# Patient Record
Sex: MALE | Race: BLACK OR AFRICAN AMERICAN | NOT HISPANIC OR LATINO | Employment: FULL TIME | URBAN - METROPOLITAN AREA
[De-identification: names, ages, dates, MRNs, and addresses within clinical notes are randomized per-mention and may not be internally consistent; named-entity substitution may affect disease eponyms.]

---

## 2017-02-15 ENCOUNTER — ALLSCRIPTS OFFICE VISIT (OUTPATIENT)
Dept: OTHER | Facility: OTHER | Age: 37
End: 2017-02-15

## 2017-03-15 ENCOUNTER — HOSPITAL ENCOUNTER (EMERGENCY)
Facility: HOSPITAL | Age: 37
Discharge: HOME | End: 2017-03-16
Attending: EMERGENCY MEDICINE | Admitting: EMERGENCY MEDICINE
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
## 2017-03-15 RX ORDER — IBUPROFEN 600 MG/1
600 TABLET ORAL ONCE
Status: COMPLETED | OUTPATIENT
Start: 2017-03-16 | End: 2017-03-16

## 2017-03-16 ENCOUNTER — APPOINTMENT (EMERGENCY)
Dept: RADIOLOGY | Facility: HOSPITAL | Age: 37
End: 2017-03-16

## 2017-03-16 VITALS
BODY MASS INDEX: 43.4 KG/M2 | HEART RATE: 117 BPM | SYSTOLIC BLOOD PRESSURE: 130 MMHG | RESPIRATION RATE: 20 BRPM | TEMPERATURE: 98.6 F | OXYGEN SATURATION: 99 % | WEIGHT: 310 LBS | HEIGHT: 71 IN | DIASTOLIC BLOOD PRESSURE: 61 MMHG

## 2017-03-16 PROCEDURE — 70486 CT MAXILLOFACIAL W/O DYE: CPT

## 2017-03-16 PROCEDURE — 70450 CT HEAD/BRAIN W/O DYE: CPT

## 2017-03-16 PROCEDURE — 99284 EMERGENCY DEPT VISIT MOD MDM: CPT

## 2017-03-16 RX ADMIN — IBUPROFEN 600 MG: 600 TABLET, FILM COATED ORAL at 01:12

## 2017-08-15 DIAGNOSIS — Z13.1 ENCOUNTER FOR SCREENING FOR DIABETES MELLITUS: ICD-10-CM

## 2018-01-10 NOTE — PROGRESS NOTES
Assessment    1  Encounter for preventive health examination (V70 0) (Z00 00)   2  Screening for cardiovascular condition (V81 2) (Z13 6)   3  Screening for diabetes mellitus (V77 1) (Z13 1)   4  Family history of Colon carcinoma : Father    Plan  Health Maintenance    · Call [phone] if: You have any warning signs of skin cancer ; Status:Complete;    Done: [id number]   · Always use a seat belt and shoulder strap when riding or driving a motor vehicle ;  Status:Complete;   Done: 00QUH5215   · Begin a limited exercise program ; Status:Complete;   Done: 57RCI4655   · Brush your teeth 3 times a day and floss at least once a day ; Status:Complete;   Done:  68OPN8929   · Eat a low fat and low cholesterol diet ; Status:Complete;   Done: 47AYW7443   · Limit your use of alcohol to 2 drinks or cans of beer a day ; Status:Complete;   Done:  30ULQ4757   · Some eating tips that can help you lose weight ; Status:Complete;   Done: 38CMV4527   · Stretch and warm up your muscles during the first 10 minutes , then cool down your  muscles for the last 10 minutes of exercise ; Status:Complete;   Done: 87TEJ9276   · There are many ways to reduce your risk of catching or spreading a sexually transmitted  Infection ; Status:Complete;   Done: 16JYA1490   · Use a sun block product with an SPF of 15 or more ; Status:Complete;   Done:  81YTC0099   · We encourage all of our patients to exercise regularly  30 minutes of exercise or physical  activity five or more days a week is recommended for children and adults ;  Status:Complete;   Done: 34PCO9974   · We recommend routine visits to a dentist ; Status:Complete;   Done: 81KTK3313  Screening for diabetes mellitus    · (1) CBC/PLT/DIFF; Status:Active; Requested for:95Ejy4838;    · (1) COMPREHENSIVE METABOLIC PANEL; Status:Active; Requested for:64Aop0364;    · (1) HEMOGLOBIN A1C; Status:Active; Requested for:87Ptu7002;    · (1) LIPID PANEL, FASTING; Status:Active;  Requested for:32Quj1813;    · (1) TSH; Status:Active; Requested for:25Hwe9208;    · (1) URINALYSIS (will reflex a microscopy if leukocytes, occult blood, protein or nitrites  are not within normal limits); Status:Active; Requested for:57Oqh2486;     I will obtain the records from his prior doctor  Recommended to use condoms when sexually active  Continue efforts with diet and exercise  I will see him in 6 months when he will be due for his next lab work  He is to call the office if there's any problems  I would recommend he get his first colonoscopy at age 35  Chief Complaint  WELL ADULT      History of Present Illness  HM, Adult Male: The patient is being seen for a Patient is a 69-year-old male presented to the office for a wellness check and to establish as a new patient  evaluation  General Health: He has regular dental visits  He denies vision problems  Vision care includes an eye examination more than a year ago  Lifestyle:  He has weight concerns  He exercises regularly  He does not use tobacco  He consumes alcohol  He reports once / month  He denies drug use  Reproductive health:  the patient is sexually active  Screening: cancer screening reviewed and current  metabolic screening reviewed and current  risk screening reviewed and current  Review of Systems    Constitutional: No fever or chills, feels well, no tiredness, no recent weight gain or weight loss    The patient presents with complaints of 30 - 39 pound recent weight loss  Previous Evaluation: By diet and exercise  He is made dramatic changes in getting rid of high calorie foods  Eyes: No complaints of eye pain, no red eyes, no discharge from eyes, no itchy eyes  ENT: no complaints of earache, no hearing loss, no nosebleeds, no nasal discharge, no sore throat, no hoarseness  Cardiovascular: No complaints of slow heart rate, no fast heart rate, no chest pain, no palpitations, no leg claudication, no lower extremity     Respiratory: No complaints of shortness of breath, no wheezing, no cough, no SOB on exertion, no orthopnea or PND  Gastrointestinal: No complaints of abdominal pain, no constipation, no nausea or vomiting, no diarrhea or bloody stools  Genitourinary: No complaints of dysuria, no incontinence, no hesitancy, no nocturia, no genital lesion, no testicular pain  Musculoskeletal: No complaints of arthralgia, no myalgias, no joint swelling or stiffness, no limb pain or swelling  Integumentary: No complaints of skin rash or skin lesions, no itching, no skin wound, no dry skin  Neurological: No compliants of headache, no confusion, no convulsions, no numbness or tingling, no dizziness or fainting, no limb weakness, no difficulty walking  Psychiatric: Is not suicidal, no sleep disturbances, no anxiety or depression, no change in personality, no emotional problems  Endocrine: No complaints of proptosis, no hot flashes, no muscle weakness, no erectile dysfunction, no deepening of the voice, no feelings of weakness  Hematologic/Lymphatic: No complaints of swollen glands, no swollen glands in the neck, does not bleed easily, no easy bruising  Surgical History    · History of Elective Circumcision   · History of Knee Arthroscopy With Medial Meniscectomy   · History of Patellar Arthroplasty    Family History  Mother    · No pertinent family history  Father    · Family history of Colon carcinoma    Social History    · Alcohol use (V49 89) (Z78 9)   · Denied: History of Drug use   · Non-smoker (V49 89) (Z78 9)    Current Meds   1  No Reported Medications Recorded    Allergies    1  No Known Drug Allergies    Vitals   Recorded: 14VDM6695 37:23PU   Systolic 194   Diastolic 78   Height 5 ft 11 in   Weight 322 lb 2 oz   BMI Calculated 44 93   BSA Calculated 2 58     Physical Exam    Constitutional   General appearance: Abnormal   overweight  Eyes   Conjunctiva and lids: No erythema, swelling or discharge      Pupils and irises: Equal, round, reactive to light  Ears, Nose, Mouth, and Throat   External inspection of ears and nose: Normal     Otoscopic examination: Tympanic membranes translucent with normal light reflex  Canals patent without erythema  Hearing: Normal     Nasal mucosa, septum, and turbinates: Normal without edema or erythema  Lips, teeth, and gums: Normal, good dentition  Oropharynx: Normal with no erythema, edema, exudate or lesions  Inspection of the oropharynx showed visible hard and soft palate and base of the uvula (Mallampati class 3)  Neck   Neck: Supple, symmetric, trachea midline, no masses  Thyroid: Normal, no thyromegaly  Pulmonary   Respiratory effort: No increased work of breathing or signs of respiratory distress  Percussion of chest: Normal     Palpation of chest: Normal     Auscultation of lungs: Clear to auscultation  Cardiovascular   Palpation of heart: Normal PMI, no thrills  Auscultation of heart: Normal rate and rhythm, normal S1 and S2, no murmurs  Carotid pulses: 2+ bilaterally  Pedal pulses: 2+ bilaterally  Examination of extremities for edema and/or varicosities: Normal     Chest   Chest: Normal     Abdomen   Abdomen: Non-tender, no masses  Liver and spleen: No hepatomegaly or splenomegaly  Lymphatic   Palpation of lymph nodes in neck: No lymphadenopathy  Palpation of lymph nodes in axillae: No lymphadenopathy  Musculoskeletal   Gait and station: Normal     Inspection/palpation of digits and nails: Normal without clubbing or cyanosis  Inspection/palpation of joints, bones, and muscles: Normal     Range of motion: Normal     Stability: Normal     Muscle strength/tone: Normal     Skin   Skin and subcutaneous tissue: Normal without rashes or lesions  Palpation of skin and subcutaneous tissue: Normal turgor  Neurologic   Cranial nerves: Cranial nerves 2-12 intact  Reflexes: 2+ and symmetric  Sensation: No sensory loss      Psychiatric Judgment and insight: Normal     Orientation to person, place and time: Normal     Recent and remote memory: Intact      Mood and affect: Normal        Signatures   Electronically signed by : Sander Barboza DO; Feb 15 2017  3:38PM EST                       (Author)

## 2018-01-12 VITALS
HEIGHT: 71 IN | BODY MASS INDEX: 44.1 KG/M2 | DIASTOLIC BLOOD PRESSURE: 78 MMHG | SYSTOLIC BLOOD PRESSURE: 120 MMHG | WEIGHT: 315 LBS